# Patient Record
Sex: FEMALE | Race: WHITE | NOT HISPANIC OR LATINO | Employment: STUDENT | URBAN - METROPOLITAN AREA
[De-identification: names, ages, dates, MRNs, and addresses within clinical notes are randomized per-mention and may not be internally consistent; named-entity substitution may affect disease eponyms.]

---
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
## 2024-08-08 ENCOUNTER — OFFICE VISIT (OUTPATIENT)
Dept: URGENT CARE | Facility: PHYSICIAN GROUP | Age: 19
End: 2024-08-08
Payer: COMMERCIAL

## 2024-08-08 VITALS
HEART RATE: 66 BPM | OXYGEN SATURATION: 99 % | BODY MASS INDEX: 26.03 KG/M2 | DIASTOLIC BLOOD PRESSURE: 64 MMHG | SYSTOLIC BLOOD PRESSURE: 116 MMHG | HEIGHT: 66 IN | WEIGHT: 162 LBS | RESPIRATION RATE: 18 BRPM | TEMPERATURE: 97.6 F

## 2024-08-08 DIAGNOSIS — N30.01 ACUTE CYSTITIS WITH HEMATURIA: ICD-10-CM

## 2024-08-08 LAB
APPEARANCE UR: CLEAR
BILIRUB UR STRIP-MCNC: NORMAL MG/DL
COLOR UR AUTO: NORMAL
GLUCOSE UR STRIP.AUTO-MCNC: NORMAL MG/DL
KETONES UR STRIP.AUTO-MCNC: NORMAL MG/DL
LEUKOCYTE ESTERASE UR QL STRIP.AUTO: NORMAL
NITRITE UR QL STRIP.AUTO: POSITIVE
PH UR STRIP.AUTO: 6 [PH] (ref 5–8)
POCT INT CON NEG: NEGATIVE
POCT INT CON POS: POSITIVE
POCT URINE PREGNANCY TEST: NEGATIVE
PROT UR QL STRIP: NORMAL MG/DL
RBC UR QL AUTO: NORMAL
SP GR UR STRIP.AUTO: 1.03
UROBILINOGEN UR STRIP-MCNC: 0.2 MG/DL

## 2024-08-08 PROCEDURE — 3078F DIAST BP <80 MM HG: CPT

## 2024-08-08 PROCEDURE — 99213 OFFICE O/P EST LOW 20 MIN: CPT

## 2024-08-08 PROCEDURE — 81002 URINALYSIS NONAUTO W/O SCOPE: CPT

## 2024-08-08 PROCEDURE — 3074F SYST BP LT 130 MM HG: CPT

## 2024-08-08 PROCEDURE — 81025 URINE PREGNANCY TEST: CPT

## 2024-08-08 RX ORDER — ETONOGESTREL AND ETHINYL ESTRADIOL VAGINAL RING .015; .12 MG/D; MG/D
1 RING VAGINAL
COMMUNITY

## 2024-08-08 RX ORDER — NITROFURANTOIN 25; 75 MG/1; MG/1
100 CAPSULE ORAL EVERY 12 HOURS
Qty: 10 CAPSULE | Refills: 0 | Status: SHIPPED | OUTPATIENT
Start: 2024-08-08 | End: 2024-08-13

## 2024-08-08 ASSESSMENT — ENCOUNTER SYMPTOMS
FEVER: 0
NAUSEA: 0
FLANK PAIN: 0
VOMITING: 0

## 2024-08-08 NOTE — PROGRESS NOTES
Subjective:     CHIEF COMPLAINT  Chief Complaint   Patient presents with    UTI     X 1 WEEK        HPI  Rosmery Nance is a very pleasant 19 y.o. female who presents with painful urination, increased urinary urgency, and increased urinary frequency.  Her symptoms initially started 1 week ago with increased urinary urgency and frequency during the night.  She tried taking an over-the-counter medication which she believes may have been Azo with improvement in symptoms.  Her symptoms improved for several days and had almost completely resolved.  Yesterday her symptoms abruptly worsened.  She began having increased pain with urinating, increased urinary urgency, increased urinary frequency, and a small knot of blood in her urine.  She has not had any fevers.  She denies any back tenderness.  She has not had any nausea or vomiting.    REVIEW OF SYSTEMS  Review of Systems   Constitutional:  Negative for fever.   Gastrointestinal:  Negative for nausea and vomiting.   Genitourinary:  Positive for dysuria, frequency, hematuria and urgency. Negative for flank pain.       PAST MEDICAL HISTORY  There are no problems to display for this patient.      SURGICAL HISTORY  patient denies any surgical history    ALLERGIES  Allergies   Allergen Reactions    Ampicillin Hives     Pt breaks out in hives and fever       CURRENT MEDICATIONS  Home Medications       Reviewed by Ginny Wooten P.A.-C. (Physician Assistant) on 08/08/24 at 0947  Med List Status: <None>     Medication Last Dose Status   ethinyl estradiol-etonogestrel (NUVARING) 0.12-0.015 MG/24HR vaginal ring Taking Active   lidocaine (XYLOCAINE) 2 % Solution Not Taking Active                    SOCIAL HISTORY  Social History     Tobacco Use    Smoking status: Never    Smokeless tobacco: Never   Vaping Use    Vaping status: Never Used   Substance and Sexual Activity    Alcohol use: Yes    Drug use: Not Currently    Sexual activity: Not on file       FAMILY  "HISTORY  History reviewed. No pertinent family history.       Objective:     VITAL SIGNS: /64 (BP Location: Right arm, Patient Position: Sitting, BP Cuff Size: Adult)   Pulse 66   Temp 36.4 °C (97.6 °F) (Temporal)   Resp 18   Ht 1.676 m (5' 6\")   Wt 73.5 kg (162 lb)   SpO2 99%   BMI 26.15 kg/m²     PHYSICAL EXAM  Physical Exam  Vitals reviewed.   Constitutional:       General: She is not in acute distress.     Appearance: Normal appearance. She is not ill-appearing or toxic-appearing.   HENT:      Head: Normocephalic and atraumatic.      Nose: Nose normal.      Mouth/Throat:      Mouth: Mucous membranes are moist.   Eyes:      Conjunctiva/sclera: Conjunctivae normal.   Cardiovascular:      Rate and Rhythm: Normal rate.   Pulmonary:      Effort: Pulmonary effort is normal.   Abdominal:      Tenderness: There is no right CVA tenderness or left CVA tenderness.   Skin:     General: Skin is warm and dry.   Neurological:      General: No focal deficit present.      Mental Status: She is alert.   Psychiatric:         Mood and Affect: Mood normal.         Assessment/Plan:     1. Acute cystitis with hematuria  - POCT Urinalysis  - POCT Pregnancy  - nitrofurantoin (MACROBID) 100 MG Cap; Take 1 Capsule by mouth every 12 hours for 5 days.  Dispense: 10 Capsule; Refill: 0    Other orders  - ethinyl estradiol-etonogestrel (NUVARING) 0.12-0.015 MG/24HR vaginal ring; Insert 1 Each into the vagina.  -Increase hydration  -Avoid excess caffeine/alcohol until UTI symptoms resolve  -Return to clinic if symptoms worsen or fail to resolve      MDM/Comments:  Patient has stable vital signs and is non-toxic appearing.  Urinalysis performed positive for nitrates and blood.  Patient initiated on Macrobid.  Discussed supportive care with hydration, decreased alcohol and caffeine intake, avoidance of intercourse until UTI symptoms resolve. Patient instructed to complete full course of antibiotic. Will return if body aches, chills, " fever, back/flank pain occur. Discussed signs of kidney infection. Patient demonstrated understanding of plan of care and will RTC if symptoms worsen or fail to resolve.     Differential diagnosis, natural history, supportive care, and indications for immediate follow-up discussed. All questions answered. Patient agrees with the plan of care.    Follow-up as needed if symptoms worsen or fail to improve to PCP, Urgent care or Emergency Room.    I have personally reviewed prior external notes and test results pertinent to today's visit.  I have independently reviewed and interpreted all diagnostics ordered during this urgent care acute visit.   Discussed management options (risks,benefits, and alternatives to treatment). Pt expresses understanding and the treatment plan was agreed upon. Questions were encouraged and answered to pt's satisfaction.    Please note that this dictation was created using voice recognition software. I have made a reasonable attempt to correct obvious errors, but I expect that there are errors of grammar and possibly content that I did not discover before finalizing the note.